# Patient Record
Sex: FEMALE | Race: BLACK OR AFRICAN AMERICAN | Employment: PART TIME | URBAN - METROPOLITAN AREA
[De-identification: names, ages, dates, MRNs, and addresses within clinical notes are randomized per-mention and may not be internally consistent; named-entity substitution may affect disease eponyms.]

---

## 2020-03-10 ENCOUNTER — HOSPITAL ENCOUNTER (EMERGENCY)
Age: 45
Discharge: HOME | End: 2020-03-10
Attending: EMERGENCY MEDICINE
Payer: SELF-PAY

## 2020-03-10 VITALS
BODY MASS INDEX: 33.84 KG/M2 | OXYGEN SATURATION: 98 % | HEART RATE: 102 BPM | DIASTOLIC BLOOD PRESSURE: 100 MMHG | HEIGHT: 63 IN | RESPIRATION RATE: 17 BRPM | TEMPERATURE: 98.7 F | SYSTOLIC BLOOD PRESSURE: 159 MMHG | WEIGHT: 191 LBS

## 2020-03-10 DIAGNOSIS — Z76.0 MEDICATION REFILL: Primary | ICD-10-CM

## 2020-03-10 PROCEDURE — 99282 EMERGENCY DEPT VISIT SF MDM: CPT

## 2020-03-10 RX ORDER — PROPRANOLOL HYDROCHLORIDE 10 MG/1
10 TABLET ORAL 2 TIMES DAILY
Qty: 60 TAB | Refills: 0 | Status: SHIPPED | OUTPATIENT
Start: 2020-03-10 | End: 2020-05-22

## 2020-03-10 RX ORDER — LISINOPRIL AND HYDROCHLOROTHIAZIDE 10; 12.5 MG/1; MG/1
1 TABLET ORAL DAILY
Qty: 30 TAB | Refills: 0 | Status: SHIPPED | OUTPATIENT
Start: 2020-03-10 | End: 2021-05-25 | Stop reason: DRUGHIGH

## 2020-03-10 RX ORDER — SERTRALINE HYDROCHLORIDE 50 MG/1
50 TABLET, FILM COATED ORAL DAILY
Qty: 30 TAB | Refills: 0 | Status: SHIPPED | OUTPATIENT
Start: 2020-03-10 | End: 2020-05-22

## 2020-03-10 RX ORDER — METHOTREXATE 2.5 MG/1
TABLET ORAL
Qty: 32 TAB | Refills: 0 | Status: SHIPPED | OUTPATIENT
Start: 2020-03-10 | End: 2020-05-22

## 2020-03-10 RX ORDER — LISINOPRIL AND HYDROCHLOROTHIAZIDE 10; 12.5 MG/1; MG/1
TABLET ORAL DAILY
COMMUNITY
End: 2020-03-10

## 2020-03-10 RX ORDER — PREDNISONE 5 MG/1
5 TABLET ORAL DAILY
Qty: 10 TAB | Refills: 0 | Status: SHIPPED | OUTPATIENT
Start: 2020-03-10 | End: 2020-03-20

## 2020-03-10 RX ORDER — METHOTREXATE 2.5 MG/1
TABLET ORAL
COMMUNITY
End: 2020-03-10

## 2020-03-10 NOTE — ED PROVIDER NOTES
EMERGENCY DEPARTMENT HISTORY AND PHYSICAL EXAM    8:41 AM      Date: 3/10/2020  Patient Name: [facility]    History of Presenting Illness     Chief Complaint   Patient presents with    Hand Pain    Leg Pain         History Provided By: Patient    Additional History (Context): [facility] is a 38 y.o. female with hypertension and rheumatoid arthritis who presents with complaints of elevated blood pressures and pain from her chronic rheumatoid arthritis since November. The patient states her mother passed in October, and she missed two appointments with the [facility] clinic, and was then dismissed from the practice. Since then, she has not been able to get her usual medications. She presents today with the request of a medication refill. PCP: Piyush LÓPEZ, DO    Current Outpatient Medications   Medication Sig Dispense Refill    methotrexate (RHEUMATREX) 2.5 mg tablet Take 8 tablets every 7 days. 32 Tab 0    lisinopril-hydroCHLOROthiazide (PRINZIDE, ZESTORETIC) 10-12.5 mg per tablet Take 1 Tab by mouth daily. 30 Tab 0    propranoloL (INDERAL) 10 mg tablet Take 1 Tab by mouth two (2) times a day. 60 Tab 0    sertraline (ZOLOFT) 50 mg tablet Take 1 Tab by mouth daily. 30 Tab 0    predniSONE (DELTASONE) 5 mg tablet Take 1 Tab by mouth daily for 10 days. With Breakfast 10 Tab 0    naproxen sodium (ALEVE) 220 mg cap Take  by mouth.  ergocalciferol (VITAMIN D2) 50,000 unit capsule Take 1 Cap by mouth every seven (7) days. 4 Cap 5    acetaminophen (TYLENOL) 325 mg tablet Take 2 Tabs by mouth every six (6) hours as needed for Pain.  12 Tab 1       Past History     Past Medical History:  Past Medical History:   Diagnosis Date    Breast nodule     [facility]    Carpal tunnel syndrome of right wrist     Elevated BP     Genital warts     Hyperthyroidism     Grave's Dz    Vitamin D deficiency        Past Surgical History:  Past Surgical History:   Procedure Laterality Date    HX BREAST BIOPSY      luciano       Family History:  Family History   Problem Relation Age of Onset    Hypertension Mother     Thyroid Disease Mother         hypothyroid    Hypertension Father     Heart defect Father         aortic dissection       Social History:  Social History     Tobacco Use    Smoking status: Current Some Day Smoker     Packs/day: 0.10    Smokeless tobacco: Never Used   Substance Use Topics    Alcohol use: Yes     Comment: socially    Drug use: No       Allergies: Allergies   Allergen Reactions    Amoxicillin Hives    Atenolol Nausea and Vomiting    Tapazole [Methimazole] Hives    Toprol Xl [Metoprolol Succinate] Nausea and Vomiting         Review of Systems       Review of Systems   Constitutional: Negative. HENT: Negative. Respiratory: Negative. Cardiovascular: Negative. Gastrointestinal: Negative. Genitourinary: Negative. Musculoskeletal: Positive for arthralgias and joint swelling. Negative for back pain, neck pain and neck stiffness. Neurological: Negative. All other systems reviewed and are negative. Physical Exam     Visit Vitals  BP (!) 159/100 (BP 1 Location: Right arm, BP Patient Position: At rest)   Pulse (!) 102   Temp 98.7 °F (37.1 °C)   Resp 17   Ht 5' 3\" (1.6 m)   Wt 86.6 kg (191 lb)   SpO2 98%   BMI 33.83 kg/m²         Physical Exam  Vitals signs reviewed. Constitutional:       General: She is not in acute distress. Appearance: Normal appearance. She is not ill-appearing, toxic-appearing or diaphoretic. HENT:      Head: Normocephalic and atraumatic. Right Ear: External ear normal.      Left Ear: External ear normal.      Nose: Nose normal.      Mouth/Throat:      Mouth: Mucous membranes are moist.      Pharynx: No oropharyngeal exudate or posterior oropharyngeal erythema. Eyes:      Extraocular Movements: Extraocular movements intact. Neck:      Musculoskeletal: Neck supple.    Cardiovascular: Rate and Rhythm: Regular rhythm. Tachycardia present. Pulses: Normal pulses. Heart sounds: Normal heart sounds. No murmur. No gallop. Pulmonary:      Effort: Pulmonary effort is normal.      Breath sounds: Normal breath sounds. No wheezing, rhonchi or rales. Musculoskeletal: Normal range of motion. General: Swelling, tenderness and deformity present. Comments: Arthritic changes to bilateral hands and wrists. Mild pain to palpation of hands and wrists. Skin:     General: Skin is warm and dry. Capillary Refill: Capillary refill takes less than 2 seconds. Neurological:      General: No focal deficit present. Mental Status: She is alert and oriented to person, place, and time. Cranial Nerves: No cranial nerve deficit. Diagnostic Study Results     Labs -  No results found for this or any previous visit (from the past 12 hour(s)). Radiologic Studies -   No orders to display         Medical Decision Making   I am the first provider for this patient. I reviewed the vital signs, available nursing notes, past medical history, past surgical history, family history and social history. Vital Signs-Reviewed the patient's vital signs. Records Reviewed: Nursing Notes and Old Medical Records (Time of Review: 8:41 AM)    ED Course: Progress Notes, Reevaluation, and Consults:  8:41 AM met with patient, reviewed history, performed physical exam.  Physical exam as noted above, will plan to refill patient's medications. Provider Notes (Medical Decision Making):   76-year-old female seen in the ED for request medication refills. Patient states she has been out of her medications for her hypertension as well as her rheumatoid arthritis since November. Patient medications refilled, patient given information regarding primary care follow-up for continuation of care. Patient advised to return to the ED immediately if symptoms worsen, or as needed.     Diagnosis Clinical Impression:   1. Medication refill        Disposition: home     Follow-up Information     Follow up With Specialties Details Why Contact Shahida Collins, DO Internal Medicine Call in 1 day For follow up regarding ER visit. [address] [facility] EMERGENCY DEPT Emergency Medicine  As needed, Immediately if symptoms worsen. [address]  [phone]           Patient's Medications   Start Taking    PREDNISONE (DELTASONE) 5 MG TABLET    Take 1 Tab by mouth daily for 10 days. With Breakfast    SERTRALINE (ZOLOFT) 50 MG TABLET    Take 1 Tab by mouth daily. Continue Taking    ACETAMINOPHEN (TYLENOL) 325 MG TABLET    Take 2 Tabs by mouth every six (6) hours as needed for Pain. ERGOCALCIFEROL (VITAMIN D2) 50,000 UNIT CAPSULE    Take 1 Cap by mouth every seven (7) days. NAPROXEN SODIUM (ALEVE) 220 MG CAP    Take  by mouth. These Medications have changed    Modified Medication Previous Medication    LISINOPRIL-HYDROCHLOROTHIAZIDE (PRINZIDE, ZESTORETIC) 10-12.5 MG PER TABLET lisinopril-hydroCHLOROthiazide (PRINZIDE, ZESTORETIC) 10-12.5 mg per tablet       Take 1 Tab by mouth daily. Take  by mouth daily. METHOTREXATE (RHEUMATREX) 2.5 MG TABLET methotrexate (RHEUMATREX) 2.5 mg tablet       Take 8 tablets every 7 days. Take  by mouth. PROPRANOLOL (INDERAL) 10 MG TABLET propranolol (INDERAL) 10 mg tablet       Take 1 Tab by mouth two (2) times a day. Take 1 Tab by mouth two (2) times a day. Stop Taking    HYDROCHLOROTHIAZIDE (MICROZIDE) 12.5 MG CAPSULE    Take 1 Cap by mouth daily. Angeline Simmonds, PA-C    Dictation disclaimer:  Please note that this dictation was completed with [employer], the computer voice recognition software. Quite often unanticipated grammatical, syntax, homophones, and other interpretive errors are inadvertently transcribed by the computer software. Please disregard these errors. Please excuse any errors that have escaped final proofreading.

## 2020-05-22 ENCOUNTER — HOSPITAL ENCOUNTER (EMERGENCY)
Age: 45
Discharge: HOME | End: 2020-05-22
Attending: EMERGENCY MEDICINE
Payer: SELF-PAY

## 2020-05-22 VITALS
OXYGEN SATURATION: 100 % | TEMPERATURE: 97.1 F | DIASTOLIC BLOOD PRESSURE: 114 MMHG | SYSTOLIC BLOOD PRESSURE: 183 MMHG | RESPIRATION RATE: 15 BRPM | HEART RATE: 88 BPM

## 2020-05-22 DIAGNOSIS — Z76.0 MEDICATION REFILL: Primary | ICD-10-CM

## 2020-05-22 PROCEDURE — 99281 EMR DPT VST MAYX REQ PHY/QHP: CPT

## 2020-05-22 RX ORDER — METHOTREXATE 2.5 MG/1
TABLET ORAL
Qty: 32 TAB | Refills: 0 | Status: SHIPPED | OUTPATIENT
Start: 2020-05-22

## 2020-05-22 RX ORDER — SERTRALINE HYDROCHLORIDE 50 MG/1
50 TABLET, FILM COATED ORAL DAILY
Qty: 30 TAB | Refills: 0 | Status: SHIPPED | OUTPATIENT
Start: 2020-05-22

## 2020-05-22 RX ORDER — PROPRANOLOL HYDROCHLORIDE 10 MG/1
10 TABLET ORAL 2 TIMES DAILY
Qty: 60 TAB | Refills: 0 | Status: SHIPPED | OUTPATIENT
Start: 2020-05-22

## 2020-05-22 NOTE — ED TRIAGE NOTES
Refill for propranolol 10, zoloft 50, methotrexate 2.5. No BP meds in 3 days. Denies cp, SOB.  States she has f/u with [facility]

## 2020-05-22 NOTE — ED PROVIDER NOTES
EMERGENCY DEPARTMENT HISTORY AND PHYSICAL EXAM    12:02 PM      Date: 5/22/2020  Patient Name: [facility]    History of Presenting Illness     Chief Complaint   Patient presents with    Medication Refill         History Provided By: Patient      Additional History (Context): [facility] is a 54 y.o. female who presents with medication refill. Patient states she does have follow-up with Dr. Geri Romberg. Comes in now requesting medication refills for her methotrexate for rheumatoid arthritis, blood pressure medicines and antidepressants. She denies any increased pain difficulty breathing increased depression other difficulties. Patient says she does not now have follow-up established for the beginning of next month. Social is remarkable for tobacco and alcohol  PCP: Zaki Laureano MD      Current Outpatient Medications   Medication Sig Dispense Refill    methotrexate (RHEUMATREX) 2.5 mg tablet Take 8 tablets every 7 days. 32 Tab 0    propranoloL (INDERAL) 10 mg tablet Take 1 Tab by mouth two (2) times a day. 60 Tab 0    sertraline (ZOLOFT) 50 mg tablet Take 1 Tab by mouth daily. 30 Tab 0    lisinopril-hydroCHLOROthiazide (PRINZIDE, ZESTORETIC) 10-12.5 mg per tablet Take 1 Tab by mouth daily. 30 Tab 0    naproxen sodium (ALEVE) 220 mg cap Take  by mouth.  ergocalciferol (VITAMIN D2) 50,000 unit capsule Take 1 Cap by mouth every seven (7) days. 4 Cap 5    acetaminophen (TYLENOL) 325 mg tablet Take 2 Tabs by mouth every six (6) hours as needed for Pain.  12 Tab 1       Past History     Past Medical History:  Past Medical History:   Diagnosis Date    Breast nodule     [facility] - [facility]    Carpal tunnel syndrome of right wrist     Elevated BP     Genital warts     Hyperthyroidism     Grave's Dz    Vitamin D deficiency        Past Surgical History:  Past Surgical History:   Procedure Laterality Date    HX BREAST BIOPSY      luciano       Family History:  Family History   Problem Relation Age of Onset    Hypertension Mother     Thyroid Disease Mother         hypothyroid    Hypertension Father     Heart defect Father         aortic dissection       Social History:  Social History     Tobacco Use    Smoking status: Current Some Day Smoker     Packs/day: 0.10    Smokeless tobacco: Never Used   Substance Use Topics    Alcohol use: Yes     Comment: socially    Drug use: No       Allergies: Allergies   Allergen Reactions    Amoxicillin Hives    Atenolol Nausea and Vomiting    Tapazole [Methimazole] Hives    Toprol Xl [Metoprolol Succinate] Nausea and Vomiting         Review of Systems       Review of Systems   Constitutional: Negative for chills, diaphoresis and fever. HENT: Negative for congestion. Eyes: Negative for visual disturbance. Respiratory: Negative for cough, chest tightness and shortness of breath. Cardiovascular: Negative for chest pain. Gastrointestinal: Negative for abdominal pain, diarrhea, nausea and vomiting. Musculoskeletal: Negative for back pain. Skin: Negative for rash. Neurological: Negative for dizziness, syncope and weakness. All other systems reviewed and are negative. Physical Exam     Visit Vitals  BP (!) 183/114   Pulse 88   Temp 97.1 °F (36.2 °C)   Resp 15   SpO2 100%       Physical Exam  Vitals signs and nursing note reviewed. Constitutional:       General: She is not in acute distress. Appearance: She is well-developed. HENT:      Head: Normocephalic and atraumatic. Eyes:      General: No scleral icterus. Conjunctiva/sclera: Conjunctivae normal.      Pupils: Pupils are equal, round, and reactive to light. Neck:      Musculoskeletal: Normal range of motion and neck supple. Cardiovascular:      Rate and Rhythm: Normal rate and regular rhythm. Heart sounds: Normal heart sounds. No murmur. Pulmonary:      Effort: Pulmonary effort is normal. No respiratory distress.       Breath sounds: Normal breath sounds. Abdominal:      General: Bowel sounds are normal. There is no distension. Palpations: Abdomen is soft. Tenderness: There is no abdominal tenderness. Musculoskeletal:      Comments: Rheumatoid changes noticed to her wrist and hands bilaterally   Lymphadenopathy:      Cervical: No cervical adenopathy. Skin:     General: Skin is warm and dry. Findings: No rash. Neurological:      Mental Status: She is alert and oriented to person, place, and time. Coordination: Coordination normal.   Psychiatric:         Behavior: Behavior normal.           Diagnostic Study Results     Labs -  No results found for this or any previous visit (from the past 12 hour(s)). Radiologic Studies -   No orders to display         Medical Decision Making   I am the first provider for this patient. I reviewed the vital signs, available nursing notes, past medical history, past surgical history, family history and social history. Vital Signs-Reviewed the patient's vital signs. EKG:    Records Reviewed: Nursing Notes and Old Medical Records (Time of Review: 12:02 PM)    ED Course: Progress Notes, Reevaluation, and Consults:      Provider Notes (Medical Decision Making):   MDM  Number of Diagnoses or Management Options  Medication refill:   Diagnosis management comments: Medication refill hypertensive in the emergency department is likely due to medication noncompliance patient is asymptomatic at present          Critical Care Time:       Diagnosis     Clinical Impression:   1.  Medication refill        Disposition: Home    Follow-up Information     Follow up With Specialties Details Why [facility] EMERGENCY DEPT Emergency Medicine  As needed, If symptoms worsen [address]  [phone]    Omar Espana MD Family Practice Schedule an appointment as soon as possible for a visit for ED Follow up appointment  [address] [place] [address]   1st floor  [place] [id number]  [phone]      Dr Rachael Morgan as scheduled                Patient's Medications   Start Taking    No medications on file   Continue Taking    ACETAMINOPHEN (TYLENOL) 325 MG TABLET    Take 2 Tabs by mouth every six (6) hours as needed for Pain. ERGOCALCIFEROL (VITAMIN D2) 50,000 UNIT CAPSULE    Take 1 Cap by mouth every seven (7) days. LISINOPRIL-HYDROCHLOROTHIAZIDE (PRINZIDE, ZESTORETIC) 10-12.5 MG PER TABLET    Take 1 Tab by mouth daily. NAPROXEN SODIUM (ALEVE) 220 MG CAP    Take  by mouth. These Medications have changed    Modified Medication Previous Medication    METHOTREXATE (RHEUMATREX) 2.5 MG TABLET methotrexate (RHEUMATREX) 2.5 mg tablet       Take 8 tablets every 7 days. Take 8 tablets every 7 days. PROPRANOLOL (INDERAL) 10 MG TABLET propranoloL (INDERAL) 10 mg tablet       Take 1 Tab by mouth two (2) times a day. Take 1 Tab by mouth two (2) times a day. SERTRALINE (ZOLOFT) 50 MG TABLET sertraline (ZOLOFT) 50 mg tablet       Take 1 Tab by mouth daily. Take 1 Tab by mouth daily. Stop Taking    No medications on file     _______________________________    Please note that this dictation was completed with [employer], the computer voice recognition software. Quite often unanticipated grammatical, syntax, homophones, and other interpretive errors are inadvertently transcribed by the computer software. Please disregard these errors. Please excuse any errors that have escaped final proofreading.

## 2021-01-04 ENCOUNTER — APPOINTMENT (OUTPATIENT)
Dept: GENERAL RADIOLOGY | Age: 46
End: 2021-01-04
Attending: PHYSICIAN ASSISTANT
Payer: MEDICAID

## 2021-01-04 ENCOUNTER — HOSPITAL ENCOUNTER (EMERGENCY)
Age: 46
Discharge: HOME | End: 2021-01-04
Attending: EMERGENCY MEDICINE
Payer: MEDICAID

## 2021-01-04 VITALS
RESPIRATION RATE: 18 BRPM | DIASTOLIC BLOOD PRESSURE: 109 MMHG | HEIGHT: 63 IN | WEIGHT: 190 LBS | SYSTOLIC BLOOD PRESSURE: 151 MMHG | HEART RATE: 90 BPM | OXYGEN SATURATION: 98 % | BODY MASS INDEX: 33.66 KG/M2 | TEMPERATURE: 98.3 F

## 2021-01-04 DIAGNOSIS — T07.XXXA MULTIPLE CONTUSIONS: Primary | ICD-10-CM
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
## 2021-01-04 PROCEDURE — 73564 X-RAY EXAM KNEE 4 OR MORE: CPT

## 2021-01-04 PROCEDURE — 99282 EMERGENCY DEPT VISIT SF MDM: CPT

## 2021-01-04 PROCEDURE — 73590 X-RAY EXAM OF LOWER LEG: CPT

## 2021-01-04 RX ORDER — TRAMADOL HYDROCHLORIDE 50 MG/1
50 TABLET ORAL
Qty: 12 TAB | Refills: 0 | Status: SHIPPED | OUTPATIENT
Start: 2021-01-04 | End: 2021-01-07

## 2021-01-04 NOTE — ED PROVIDER NOTES
[facility] EMERGENCY DEPT    Date: 1/4/2021  Patient Name: [facility]    History of Presenting Illness     Chief Complaint   Patient presents with   Khoury Fall    Leg Pain    Shoulder Pain    Hand Pain     54 y.o. female with a past medical history as noted presents the ED complaining of right shoulder pain, left hand pain, right knee/shin pain onset 2 nights ago. Patient states that it was dark and she accidentally stepped in a hole, injuring her right knee/shin, she attempted to catch herself, injuring her shoulder. She describes a constant moderate aching pain, worse with ambulating. She denies any numbness, weakness, head injury, neck or back pain, any other symptoms at this time. Patient denies any other associated signs or symptoms. Patient denies any other complaints. Nursing notes regarding the HPI and triage nursing notes were reviewed. Prior medical records were reviewed. Current Outpatient Medications   Medication Sig Dispense Refill    traMADoL (Ultram) 50 mg tablet Take 1 Tab by mouth every six (6) hours as needed for Pain for up to 3 days. Max Daily Amount: 200 mg. 12 Tab 0    methotrexate (RHEUMATREX) 2.5 mg tablet Take 8 tablets every 7 days. 32 Tab 0    propranoloL (INDERAL) 10 mg tablet Take 1 Tab by mouth two (2) times a day. 60 Tab 0    sertraline (ZOLOFT) 50 mg tablet Take 1 Tab by mouth daily. 30 Tab 0    lisinopril-hydroCHLOROthiazide (PRINZIDE, ZESTORETIC) 10-12.5 mg per tablet Take 1 Tab by mouth daily. 30 Tab 0    naproxen sodium (ALEVE) 220 mg cap Take  by mouth.  ergocalciferol (VITAMIN D2) 50,000 unit capsule Take 1 Cap by mouth every seven (7) days. 4 Cap 5    acetaminophen (TYLENOL) 325 mg tablet Take 2 Tabs by mouth every six (6) hours as needed for Pain.  12 Tab 1       Past History     Past Medical History:  Past Medical History:   Diagnosis Date    Breast nodule     R breast - [facility]    Carpal tunnel syndrome of right wrist     Elevated BP     Genital warts     Hyperthyroidism     Grave's Dz    Vitamin D deficiency        Past Surgical History:  Past Surgical History:   Procedure Laterality Date    HX BREAST BIOPSY      bengin       Family History:  Family History   Problem Relation Age of Onset    Hypertension Mother     Thyroid Disease Mother         hypothyroid    Hypertension Father     Heart defect Father         aortic dissection       Social History:  Social History     Tobacco Use    Smoking status: Current Some Day Smoker     Packs/day: 0.10    Smokeless tobacco: Never Used   Substance Use Topics    Alcohol use: Yes     Comment: socially    Drug use: No       Allergies: Allergies   Allergen Reactions    Amoxicillin Hives    Atenolol Nausea and Vomiting    Tapazole [Methimazole] Hives    Toprol Xl [Metoprolol Succinate] Nausea and Vomiting       Patient's primary care provider (as noted in EPIC):  Alea Erickson MD    Review of Systems   Constitutional:  Denies malaise, fever, chills. Head:  Denies injury. Neck:  Denies injury or pain. Chest:  Denies injury. Cardiac:  Denies chest pain or palpitations. Back:  Denies injury or pain. Pelvis:  Denies injury or pain. Extremity/MS: + right shin/knee pain, right shoulder pain, left hand pain. Neuro:  Denies headache, LOC, dizziness, neurologic symptoms/deficits/paresthesias. Skin: Denies injury, rash, itching or skin changes. All other systems negative as reviewed. Visit Vitals  BP (!) 151/109 (BP 1 Location: Right arm, BP Patient Position: At rest)   Pulse 90   Temp 98.3 °F (36.8 °C)   Resp 18   Ht 5' 3\" (1.6 m)   Wt 86.2 kg (190 lb)   SpO2 98%   BMI 33.66 kg/m²       PHYSICAL EXAM:    CONSTITUTIONAL: Alert, in no apparent distress; well-developed; well-nourished. HEAD:  Normocephalic, atraumatic. No Battles sign. No Raccoons eyes. EYES:  EOM's intact. Normal conjunctiva. Anicteric sclera.   ENTM: Nose: no rhinorrhea; Oropharynx:  mucous membranes moist  Neck:  No cervical vertebral bony point tenderness or step-off. RESP: Chest clear, equal breath sounds. CARDIOVASCULAR:  Regular rate and rhythm. No murmurs, rubs, or gallops. BACK:  No TLS vertebral bony point tenderness or step-off. UPPER EXT:  Normal inspection, right shoulder with minimal reproducible pain on range of motion, no tenderness palpation, neurovascular intact distally with 5/5 strength. Left hand minimal tenderness palpation, very superficial abrasions. LOWER EXT: Right superior medial shin with ecchymosis and reproducible tenderness palpation, pain with range of motion of right knee, neurovascular intact distally with 5/5 strength, normal gait; ligaments intact without any laxity or pain upon stressing. NEURO: Grossly normal motor and sensation. SKIN: No rashes; Normal for age and stage. PSYCH:  Alert and oriented, normal affect. DIFFERENTIAL DIAGNOSES/ MEDICAL DECISION MAKING:  Contusion, hematoma, muscle strain/ sprain, ligamentous strain/ sprain, ligamentous tear/ disruption or a combination of the above. ED COURSE:      X-ray right tib-fib: NAD  X-ray right knee: NAD    IMPRESSION AND MEDICAL DECISION MAKING:  Based upon the patients presentation with noted HPI and PE, along with the work up done in the emergency department, I believe that the patient is having noted contusion(s) from fall. No sign of acute fracture, no bony tenderness to right shoulder, will not image. Left hand also without TTP, healing superficial abrasions. Patient may take Ultram for pain, follow-up with PCP, return for any acute worsening. Diagnosis:   1. Multiple contusions    2.  Fall, initial encounter      Disposition: Discharge    Follow-up Information     Follow up With Specialties Details Why Contact Info    Dinesh Lu MD Family Medicine In 3 days  [address] [place] [address]   1st floor  [phone] [facility] EMERGENCY DEPT Emergency Medicine  If symptoms worsen [address]  [phone]          Discharge Medication List as of 1/4/2021  5:52 PM      START taking these medications    Details   traMADoL (Ultram) 50 mg tablet Take 1 Tab by mouth every six (6) hours as needed for Pain for up to 3 days. Max Daily Amount: 200 mg., Normal, Disp-12 Tab, R-0         CONTINUE these medications which have NOT CHANGED    Details   methotrexate (RHEUMATREX) 2.5 mg tablet Take 8 tablets every 7 days. , Normal, Disp-32 Tab, R-0      propranoloL (INDERAL) 10 mg tablet Take 1 Tab by mouth two (2) times a day., Normal, Disp-60 Tab, R-0      sertraline (ZOLOFT) 50 mg tablet Take 1 Tab by mouth daily. , Normal, Disp-30 Tab, R-0      lisinopril-hydroCHLOROthiazide (PRINZIDE, ZESTORETIC) 10-12.5 mg per tablet Take 1 Tab by mouth daily. , Normal, Disp-30 Tab, R-0      naproxen sodium (ALEVE) 220 mg cap Take  by mouth. Historical Med      ergocalciferol (VITAMIN D2) 50,000 unit capsule Take 1 Cap by mouth every seven (7) days. Normal, 50,000 Units, Disp-4 Cap, R-5      acetaminophen (TYLENOL) 325 mg tablet Take 2 Tabs by mouth every six (6) hours as needed for Pain. Print, 650 mg, Disp-12 Tab, R-1           JUNI Brantley

## 2021-01-04 NOTE — ED TRIAGE NOTES
Pt arrives through triage c/o Rt leg pain, shoulder pain and hand pain after falling in an open \"city hole\" on Saturday night. Pt has significant bruising to Rt lower leg. Pt is not on blood thinners.

## 2021-05-25 ENCOUNTER — OFFICE VISIT (OUTPATIENT)
Dept: CARDIOLOGY CLINIC | Age: 46
End: 2021-05-25
Payer: MEDICAID

## 2021-05-25 VITALS
SYSTOLIC BLOOD PRESSURE: 112 MMHG | DIASTOLIC BLOOD PRESSURE: 78 MMHG | TEMPERATURE: 97.2 F | RESPIRATION RATE: 16 BRPM | BODY MASS INDEX: 29.76 KG/M2 | WEIGHT: 168 LBS | OXYGEN SATURATION: 99 % | HEART RATE: 78 BPM

## 2021-05-25 DIAGNOSIS — I10 HYPERTENSION, UNSPECIFIED TYPE: Primary | ICD-10-CM

## 2021-05-25 DIAGNOSIS — I71.9 AORTIC ANEURYSM WITHOUT RUPTURE, UNSPECIFIED PORTION OF AORTA (HCC): ICD-10-CM

## 2021-05-25 PROCEDURE — 99204 OFFICE O/P NEW MOD 45 MIN: CPT | Performed by: INTERNAL MEDICINE

## 2021-05-25 PROCEDURE — 93000 ELECTROCARDIOGRAM COMPLETE: CPT | Performed by: INTERNAL MEDICINE

## 2021-05-25 RX ORDER — ACETAMINOPHEN 500 MG
TABLET ORAL
Qty: 1 KIT | Refills: 0 | Status: SHIPPED | OUTPATIENT
Start: 2021-05-25

## 2021-05-25 RX ORDER — LISINOPRIL AND HYDROCHLOROTHIAZIDE 20; 25 MG/1; MG/1
TABLET ORAL DAILY
COMMUNITY

## 2021-05-25 NOTE — PATIENT INSTRUCTIONS
Testing   Echo  **call office 3-5 days after testing is completed for results**     BLOOD PRESSURE KIT

## 2021-05-25 NOTE — PROGRESS NOTES
Remedios Thomas presents today for   Chief Complaint   Patient presents with   [facility] New Patient       Remedios Thomas preferred language for health care discussion is english/other. Personal Protective Equipment:   Personal Protective Equipment was used including: mask-surgical and hands-gloves. Patient was placed on no precaution(s). Patient was masked. Precautions:   Patient currently on None  Patient currently roomed with door closed    Is someone accompanying this pt? no    Is the patient using any DME equipment during [address]? no    Depression Screening:  3 most recent PHQ Screens 5/25/2021   Little interest or pleasure in doing things Not at all   Feeling down, depressed, irritable, or hopeless Not at all   Total Score PHQ 2 0       Learning Assessment:  No flowsheet data found. Abuse Screening:  Completed    Fall Risk  Completed    Pt currently taking Anticoagulant therapy? no    Coordination of Care:  1. Have you been to the ER, urgent care clinic since your last visit? Hospitalized since your last visit? no    2. Have you seen or consulted any other health care providers outside of the [address] [place] [address] since your last visit? Include any pap smears or colon screening.  no

## 2021-05-25 NOTE — LETTER
5/25/2021 Patient: Kathie Nova YOB: 1975 Date of Visit: 5/25/2021 Luis Ortiz NP 
[address] [place] [address] [phone] Via Fax: [phone] Dear Luis Ortiz NP, Thank you for referring Ms. Mark Davis to [address] [place] [address] for evaluation. My notes for this consultation are attached. If you have questions, please do not hesitate to call me. I look forward to following your patient along with you. Sincerely, Briana Patel MD

## 2021-05-25 NOTE — PROGRESS NOTES
[facility]    Ms. Lashawn Arias is 42-year-old female with a history of Graves' disease, hypertension, hypothyroidism, obesity, rheumatoid arthritis and tobacco Ms. disorder    Patient is here today for initial cardiac evaluation. She denies any prior history of MI or CHF. Patient is sent here for the management of hypertension which has been difficult to control. Patient denies any symptoms that is concerning for angina or heart failure. She denies any palpitation, presyncope or syncope. She denies PND or any significant lower examinee swelling. With the help of diet, she has been able to lost about 10 pounds. She admits to salt restriction in her diet. Denies any nausea, vomiting, abdominal pain, fever, chills, sputum production. No hematuria or other bleeding complaints    Past Medical History:   Diagnosis Date    Breast nodule     [facility]    Carpal tunnel syndrome of right wrist     Genital warts     Graves disease     HTN (hypertension)     Hyperthyroidism     Grave's Dz    Obesity     Max weight 268 lbs    Rheumatoid arthritis ([address]     Tobacco abuse     Vitamin D deficiency        Review of Systems:  Cardiac symptoms as noted above in HPI. All others negative. Denies fatigue, malaise, skin rash, blurring vision, photophobia, neck pain, hemoptysis, chronic cough, nausea, vomiting, hematuria, burning micturition, BRBPR, chronic headaches. Current Outpatient Medications   Medication Sig    lisinopril-hydroCHLOROthiazide (PRINZIDE, ZESTORETIC) 20-25 mg per tablet Take  by mouth daily.  Blood Pressure Monitor kit 1 BP MONITOR KIT    methotrexate (RHEUMATREX) 2.5 mg tablet Take 8 tablets every 7 days.  propranoloL (INDERAL) 10 mg tablet Take 1 Tab by mouth two (2) times a day.  sertraline (ZOLOFT) 50 mg tablet Take 1 Tab by mouth daily.  naproxen sodium (ALEVE) 220 mg cap Take  by mouth.  ergocalciferol (VITAMIN D2) 50,000 unit capsule Take 1 Cap by mouth every seven (7) days.  acetaminophen (TYLENOL) 325 mg tablet Take 2 Tabs by mouth every six (6) hours as needed for Pain. No current facility-administered medications for this visit. Past Surgical History:   Procedure Laterality Date    HX BREAST BIOPSY      bengin       Allergies and Sensitivities:  Allergies   Allergen Reactions    Amoxicillin Hives    Atenolol Nausea and Vomiting    Tapazole [Methimazole] Hives    Toprol Xl [Metoprolol Succinate] Nausea and Vomiting       Family History:  Family History   Problem Relation Age of Onset    Hypertension Mother     Thyroid Disease Mother         hypothyroid    Hypertension Father     Heart defect Father         aortic dissection       Social History:  Social History     Tobacco Use    Smoking status: Current Some Day Smoker     Packs/day: 0.10    Smokeless tobacco: Never Used   Substance Use Topics    Alcohol use: Yes     Comment: socially    Drug use: No     She  reports that she has been smoking. She has been smoking about 0.10 packs per day. She has never used smokeless tobacco.  She  reports current alcohol use. Physical Exam:  BP Readings from Last 3 Encounters:   05/25/21 112/78   01/04/21 (!) 151/109   05/22/20 (!) 183/114         Pulse Readings from Last 3 Encounters:   05/25/21 78   01/04/21 90   05/22/20 88          Wt Readings from Last 3 Encounters:   05/25/21 76.2 kg (168 lb)   01/04/21 86.2 kg (190 lb)   03/10/20 86.6 kg (191 lb)       Constitutional: Oriented to person, place, and time. HENT: Head: Normocephalic and atraumatic. Eyes: Conjunctivae and extraocular motions are normal.   Neck: No JVD present. Carotid bruit is not appreciated. Cardiovascular: Regular rhythm. No murmur, gallop or rubs appreciated  Lung: Breath sounds normal. No respiratory distress. No ronchi or rales appreciated  Abdominal: No tenderness. No rebound and no guarding. Musculoskeletal: There is no lower extremity edema. No cynosis  Lymphadenopathy:  No cervical or supraclavicular adenopathy appriciated. Neurological: No gross motor deficit noted. Skin: No visible skin rash noted. No Ear discharge noted  Psychiatric: Normal mood and affect. LABS:   @  Lab Results   Component Value Date/Time    WBC 6.7 04/04/2013 12:00 AM    HGB 12.5 04/04/2013 12:00 AM    HCT 36.9 04/04/2013 12:00 AM    PLATELET 861 59/64/0935 12:00 AM    MCV 78 (L) 04/04/2013 12:00 AM     Lab Results   Component Value Date/Time    Sodium 137 04/04/2013 12:00 AM    Potassium 4.0 04/04/2013 12:00 AM    Chloride 103 04/04/2013 12:00 AM    CO2 22 04/04/2013 12:00 AM    Glucose 100 (H) 04/04/2013 12:00 AM    BUN 13 04/04/2013 12:00 AM    Creatinine 0.39 (L) 04/04/2013 12:00 AM     No flowsheet data found. Lab Results   Component Value Date/Time    ALT (SGPT) 31 04/04/2013 12:00 AM     No results found for: HBA1C, XZI7UMNQ, TEA6WTRL, ETO4EEFQ  Lab Results   Component Value Date/Time    TSH 0.005 (L) 04/04/2013 12:00 AM       EKG  05/25/2021: Sinus rhythm at 82 bpm.  No pathologic Q waves. No ST changes of ischemia. Normal EKG    ECHO    STRESS TEST (EST, PHARM, NUC, ECHO etc)    CATHETERIZATION    IMPRESSION & PLAN:  Ms. Laureen Warner is 52-year female with multiple medical problem    Hypertension:  Currently on propranolol and hydrochlorothiazide and lisinopril. /78 mmHg. Patient is surprised and happy that her blood pressure is better. Nonpharmacologic intervention of blood pressure including but not limited to salt restriction, dietary potassium supplement, weight loss, aerobic exercise. .. Discussed  Echo ordered to rule out hypertensive cardiovascular heart disease and to rule out any aortic aneurysm especially as father had aortic aneurysmal rupture. Hyperthyroidism and Graves' disease:  Currently she is on propranolol.   I will defer this management to PCP    Tobacco abuse disorder:  Patient is currently smoking 0.5 pack/day. She used to smoke more. She has done that for several years. Patient educated for consequences/sequela and risk of smoking including but not limited to CAD/PAD/stroke/COPD/lung cancer/other cancer and death. Patient understood completely and expressed and verbalized understanding. Today I had a lengthy discussion with the patient for more than 5 minutes discussing about importance of smoking cessation. Counseling was offered. Patient is working towards that goal.  Patient has nicotine patch and she is going to start wearing them. Importance of diet and exercise was discussed with patient. This plan was discussed with patient who is in agreement. Thank you for allowing me to participate in patient care. Please feel free to call me if you have any question or concern. George Sanford MD  Please note: This document has been produced using voice recognition software. Unrecognized errors in transcription may be present.

## 2025-02-10 ENCOUNTER — TRANSCRIBE ORDERS (OUTPATIENT)
Facility: HOSPITAL | Age: 50
End: 2025-02-10

## 2025-02-10 DIAGNOSIS — N64.52 NIPPLE DISCHARGE IN FEMALE: Primary | ICD-10-CM
